# Patient Record
Sex: FEMALE | Race: ASIAN | NOT HISPANIC OR LATINO | URBAN - METROPOLITAN AREA
[De-identification: names, ages, dates, MRNs, and addresses within clinical notes are randomized per-mention and may not be internally consistent; named-entity substitution may affect disease eponyms.]

---

## 2017-10-13 ENCOUNTER — EMERGENCY (EMERGENCY)
Facility: HOSPITAL | Age: 27
LOS: 1 days | Discharge: HOME | End: 2017-10-13
Attending: EMERGENCY MEDICINE | Admitting: EMERGENCY MEDICINE
Payer: MEDICAID

## 2017-10-13 VITALS
TEMPERATURE: 98 F | HEART RATE: 76 BPM | RESPIRATION RATE: 18 BRPM | SYSTOLIC BLOOD PRESSURE: 123 MMHG | DIASTOLIC BLOOD PRESSURE: 86 MMHG | OXYGEN SATURATION: 97 %

## 2017-10-13 PROCEDURE — 99283 EMERGENCY DEPT VISIT LOW MDM: CPT

## 2017-10-13 RX ORDER — AMOXICILLIN 250 MG/5ML
500 SUSPENSION, RECONSTITUTED, ORAL (ML) ORAL ONCE
Qty: 0 | Refills: 0 | Status: COMPLETED | OUTPATIENT
Start: 2017-10-13 | End: 2017-10-13

## 2017-10-13 RX ORDER — AMPICILLIN TRIHYDRATE 250 MG
1 CAPSULE ORAL
Qty: 21 | Refills: 0 | OUTPATIENT
Start: 2017-10-13 | End: 2017-10-20

## 2017-10-13 RX ADMIN — Medication 500 MILLIGRAM(S): at 20:09

## 2017-10-13 NOTE — ED PROVIDER NOTE - OBJECTIVE STATEMENT
26y/o F with PMHx of hypothyroidism, presents to the ED c/o mild fever, cough, R ear pain, constant HA x2w. She also c/o mild abdominal pain. Pt has pain worse when hearing loud sounds. Denies vomiting, any other complaints. NKDA. 28y/o F with PMHx of hypothyroidism, presents to the ED c/o mild fever, cough, , R ear pain, mild HA x2w. She also c/o mild abdominal pain. Pt has pain worse when hearing loud sounds. Denies vomiting, any other complaints. NKDA.

## 2017-12-08 ENCOUNTER — EMERGENCY (EMERGENCY)
Facility: HOSPITAL | Age: 27
LOS: 1 days | Discharge: HOME | End: 2017-12-08
Admitting: EMERGENCY MEDICINE
Payer: MEDICAID

## 2017-12-08 VITALS
OXYGEN SATURATION: 99 % | DIASTOLIC BLOOD PRESSURE: 83 MMHG | SYSTOLIC BLOOD PRESSURE: 159 MMHG | HEART RATE: 86 BPM | TEMPERATURE: 98 F | RESPIRATION RATE: 16 BRPM

## 2017-12-08 VITALS
DIASTOLIC BLOOD PRESSURE: 87 MMHG | TEMPERATURE: 98 F | RESPIRATION RATE: 17 BRPM | SYSTOLIC BLOOD PRESSURE: 122 MMHG | HEART RATE: 88 BPM | OXYGEN SATURATION: 100 %

## 2017-12-08 LAB
APPEARANCE UR: SIGNIFICANT CHANGE UP
BACTERIA # UR AUTO: SIGNIFICANT CHANGE UP
BILIRUB UR-MCNC: NEGATIVE — SIGNIFICANT CHANGE UP
BLOOD UR QL VISUAL: NEGATIVE — SIGNIFICANT CHANGE UP
COLOR SPEC: YELLOW — SIGNIFICANT CHANGE UP
GLUCOSE UR-MCNC: NEGATIVE — SIGNIFICANT CHANGE UP
KETONES UR-MCNC: NEGATIVE — SIGNIFICANT CHANGE UP
LEUKOCYTE ESTERASE UR-ACNC: HIGH
MUCOUS THREADS # UR AUTO: SIGNIFICANT CHANGE UP
NITRITE UR-MCNC: NEGATIVE — SIGNIFICANT CHANGE UP
NON-SQ EPI CELLS # UR AUTO: <1 — SIGNIFICANT CHANGE UP
PH UR: 6.5 — SIGNIFICANT CHANGE UP (ref 4.6–8)
PROT UR-MCNC: 30 MG/DL — HIGH
RBC CASTS # UR COMP ASSIST: HIGH (ref 0–?)
SP GR SPEC: 1.03 — SIGNIFICANT CHANGE UP (ref 1–1.04)
SQUAMOUS # UR AUTO: SIGNIFICANT CHANGE UP
UROBILINOGEN FLD QL: 1 MG/DL — SIGNIFICANT CHANGE UP
WBC UR QL: >50 — HIGH (ref 0–?)

## 2017-12-08 PROCEDURE — 99284 EMERGENCY DEPT VISIT MOD MDM: CPT

## 2017-12-08 RX ORDER — METRONIDAZOLE 500 MG
500 TABLET ORAL ONCE
Qty: 0 | Refills: 0 | Status: COMPLETED | OUTPATIENT
Start: 2017-12-08 | End: 2017-12-08

## 2017-12-08 RX ORDER — METRONIDAZOLE 500 MG
1 TABLET ORAL
Qty: 21 | Refills: 0 | OUTPATIENT
Start: 2017-12-08 | End: 2017-12-15

## 2017-12-08 RX ORDER — IBUPROFEN 200 MG
600 TABLET ORAL ONCE
Qty: 0 | Refills: 0 | Status: COMPLETED | OUTPATIENT
Start: 2017-12-08 | End: 2017-12-08

## 2017-12-08 RX ORDER — IBUPROFEN 200 MG
1 TABLET ORAL
Qty: 15 | Refills: 0 | OUTPATIENT
Start: 2017-12-08 | End: 2017-12-13

## 2017-12-08 RX ADMIN — Medication 500 MILLIGRAM(S): at 21:20

## 2017-12-08 RX ADMIN — Medication 600 MILLIGRAM(S): at 21:18

## 2017-12-08 NOTE — ED PROVIDER NOTE - MEDICAL DECISION MAKING DETAILS
27y F with hx of hypothyroidism presenting with mid suprapubic/vaginal pain and subjective fever x 1 day prior episode 1 week ago resolved. Basic labs, tvus, ob-gyn follow up, UA, urine culture, UCG. 27y F with hx of hypothyroidism presenting with mid suprapubic/vaginal pain and subjective fever x 1 day prior episode 1 week ago resolved. Basic labs, TVUS, UA, urine culture, UCG, and ob-gyn follow up. 27y F with hx of hypothyroidism presenting with inner vaginal pain with intercourse, mild vaginal DC, and subjective fever x 1 day -UA,  UCG, GC/chlamydia amplification, cervical culture, GYN folllow up.

## 2017-12-08 NOTE — ED ADULT TRIAGE NOTE - CHIEF COMPLAINT QUOTE
Pt c/o abdominal pain, headache starting 3 days ago, 3 episodes of vomiting, now lower abdominal/pelvic pain. LMC 11/15. Pt denies dysuria, diarrhea, fever, chest pain, SOB.

## 2017-12-08 NOTE — ED PROVIDER NOTE - OBJECTIVE STATEMENT
27y F with PMHx of hypothyroid on Synthroid presents to the ED with pelvic pain and subjective fever x 1 day. Pt developed mid suprapubic/vaginal pain and 3 episodes of vomiting 5 days ago and symptoms resolved the following day. Pain returned this morning. Pt has subjective fever and headache today. Describes pain as pinching and currently 6-7/10 in severity. LMP: 11/15 on birth control pills. Denies dysuria, vaginal discharge, vaginal bleeding, diarrhea, or any other complaints. No surgeries. Nonsmoker. NKDA. VIA [facility]  #[id number]   27y F with PMHx of hypothyroid on Synthroid presents to the ED with pelvic pain and subjective fever x 1 day. Pt developed inner vaginal pain during intercourse 5 days ago and immediately vomited after. Pain resolved by morning and resumed again s/p intercourse last night. States is is sharp 6/10, non radiating and states subjective fever today. has not taken any pain medication. States mild vaginal DC noted today. Denies dysuria, hematuria, frequency of urination, urgency. Denies h/o STD's. , 2 child, monogamous relationship. LMP 11/15- denies chance of pregnancy. No surgeries. Nonsmoker. NKDA.  Daily meds: OCP's.

## 2017-12-08 NOTE — ED PROVIDER NOTE - GENITOURINARY [-], MLM
no dysuria, no vaginal discharge, no vaginal bleeding no hematuria/no difficulty urinating/no STD exposure

## 2017-12-08 NOTE — ED PROVIDER NOTE - CARE PLAN
Principal Discharge DX:	BV (bacterial vaginosis)  Instructions for follow-up, activity and diet:	Follow up with your GYN within 1-2 days. Take Flagyl 500mg 1 tab 3x/day for 7 days. No intercourse until your pain resolves. Take Motrin 600mg every 6-8hrs as needed for pain with food. Sitz baths as needed for pain. Worsening, continued or new concerning symptoms return to the emergency department.

## 2017-12-08 NOTE — ED ADULT NURSE NOTE - OBJECTIVE STATEMENT
pt. came in c/o intermittent suprapubic pain and subjective fever x 6 days. pt. also c/o 1 episode of vomiting when pain started Sunday. denies any dysuria nor hematuria. pt. seen by PA, meds given as ordered. no labs needed as per KAYLI Zavala. awaits dispo.

## 2017-12-08 NOTE — ED PROVIDER NOTE - PLAN OF CARE
Follow up with your GYN within 1-2 days. Take Flagyl 500mg 1 tab 3x/day for 7 days. No intercourse until your pain resolves. Take Motrin 600mg every 6-8hrs as needed for pain with food. Sitz baths as needed for pain. Worsening, continued or new concerning symptoms return to the emergency department.

## 2017-12-10 LAB — SPECIMEN SOURCE: SIGNIFICANT CHANGE UP

## 2017-12-11 LAB
C TRACH RRNA SPEC QL NAA+PROBE: SIGNIFICANT CHANGE UP
N GONORRHOEA RRNA SPEC QL NAA+PROBE: SIGNIFICANT CHANGE UP
SPECIMEN SOURCE: SIGNIFICANT CHANGE UP

## 2017-12-12 LAB — BACTERIA GENITAL AEROBE CULT: SIGNIFICANT CHANGE UP
